# Patient Record
Sex: FEMALE | Race: WHITE | Employment: OTHER | URBAN - NONMETROPOLITAN AREA
[De-identification: names, ages, dates, MRNs, and addresses within clinical notes are randomized per-mention and may not be internally consistent; named-entity substitution may affect disease eponyms.]

---

## 2023-10-25 NOTE — PROGRESS NOTES
[facility]  Pulmonary medicine clinic initial consult note. Patient: Luigi Gilbert  : 1940      Chief complaint/[place]: Luigi Gilbert is a 79 y.o. old female came for further evaluation regarding her COPD and shortness of breath with referral from Virginie Bailey, APRN - CNP. She is having shortness of breath:Yes  Onset: Gradual  Duration:{NUMBERS 0-12:14681}{DURATION:}. Diurnal variation:  None. Worse {Time; morning/afternoon/evenin::\"in the morning\"}  Functional status prior to beginning of symptoms: Distance she can walk on level ground: {NUMBERS; 100-1000 :41398} feet. Current functional capacity on level ground: Distance she can walk on level ground: {NUMBERS; 100-1000 :78609} feet. She can climb steps: {YES/NO:::\"No\"}  Flights of steps she can climb: {NUMBERS; 0-10:5044}  She gives a history of orthopnea:{YES/NO:::\"Yes\"}  She gives a history of paroxysmal nocturnal dyspnea:{YES/NO:::\"Yes\"}       She is having cough: {YES/NO:::\"Yes\"}  Duration of cough: for {NUMBERS 1-30:118663570} days. Her cough is associated with sputum production: {YES/NO:::\"Yes\"}   The sputum color: {COLOR:2373459487::\"clear\"}  Hemoptysis:{YES/NO:::\"No\"}  Diurnal variation: None. Worse {Time; morning/afternoon/evenin::\"in the morning\"}  Relieving factors: {YES/NO:::\"Yes\"}  Aggravating factors: {YES/NO:::\"No\"}    She is having chest pain:{YES/NO:::\"No\"}  Duration:Days:{NUMBERS 0-12:83049}  Onset:{DESC; ONSET:8}. Location:predominantly on {LOCATION:7165752228}  Nature/Quality:{PAIN QUALITY:47498}  Assessment:{PAIN ASSESSMENT:71676}. Radiation:{Pain radiation-gi:87774}  Scale:{pain scale:787861792}/10  Relation ship to breathing: {Increased/decreased/unchanged:59184} with inspiration.     She is currently using any oxygen supplementation at rest, exercise or during sleep/at night time:

## 2023-11-17 ENCOUNTER — HOSPITAL ENCOUNTER (OUTPATIENT)
Dept: CT IMAGING | Age: 83
Discharge: HOME | End: 2023-11-17
Attending: RADIOLOGY

## 2023-11-17 ENCOUNTER — HOSPITAL ENCOUNTER (OUTPATIENT)
Dept: GENERAL RADIOLOGY | Age: 83
Discharge: HOME | End: 2023-11-17
Attending: RADIOLOGY

## 2023-11-17 DIAGNOSIS — Z00.6 EXAMINATION FOR NORMAL COMPARISON OR CONTROL IN CLINICAL RESEARCH: ICD-10-CM

## 2023-11-20 ENCOUNTER — TELEPHONE (OUTPATIENT)
Dept: PULMONOLOGY | Age: 83
End: 2023-11-20

## 2023-11-20 ENCOUNTER — OFFICE VISIT (OUTPATIENT)
Dept: PULMONOLOGY | Age: 83
End: 2023-11-20
Payer: MEDICARE

## 2023-11-20 VITALS
WEIGHT: 88.8 LBS | BODY MASS INDEX: 16.34 KG/M2 | TEMPERATURE: 97.8 F | OXYGEN SATURATION: 93 % | HEIGHT: 62 IN | SYSTOLIC BLOOD PRESSURE: 118 MMHG | DIASTOLIC BLOOD PRESSURE: 64 MMHG | HEART RATE: 108 BPM

## 2023-11-20 DIAGNOSIS — R91.1 LUNG NODULE: ICD-10-CM

## 2023-11-20 DIAGNOSIS — J96.11 CHRONIC HYPOXIC RESPIRATORY FAILURE (HCC): ICD-10-CM

## 2023-11-20 DIAGNOSIS — J44.9 CHRONIC OBSTRUCTIVE PULMONARY DISEASE, UNSPECIFIED COPD TYPE (HCC): Primary | ICD-10-CM

## 2023-11-20 DIAGNOSIS — J44.9 CHRONIC OBSTRUCTIVE PULMONARY DISEASE, UNSPECIFIED COPD TYPE (HCC): ICD-10-CM

## 2023-11-20 PROCEDURE — 1090F PRES/ABSN URINE INCON ASSESS: CPT | Performed by: INTERNAL MEDICINE

## 2023-11-20 PROCEDURE — G8428 CUR MEDS NOT DOCUMENT: HCPCS | Performed by: INTERNAL MEDICINE

## 2023-11-20 PROCEDURE — 1036F TOBACCO NON-USER: CPT | Performed by: INTERNAL MEDICINE

## 2023-11-20 PROCEDURE — G8400 PT W/DXA NO RESULTS DOC: HCPCS | Performed by: INTERNAL MEDICINE

## 2023-11-20 PROCEDURE — G8419 CALC BMI OUT NRM PARAM NOF/U: HCPCS | Performed by: INTERNAL MEDICINE

## 2023-11-20 PROCEDURE — 3023F SPIROM DOC REV: CPT | Performed by: INTERNAL MEDICINE

## 2023-11-20 PROCEDURE — 99204 OFFICE O/P NEW MOD 45 MIN: CPT | Performed by: INTERNAL MEDICINE

## 2023-11-20 PROCEDURE — 1123F ACP DISCUSS/DSCN MKR DOCD: CPT | Performed by: INTERNAL MEDICINE

## 2023-11-20 PROCEDURE — G8484 FLU IMMUNIZE NO ADMIN: HCPCS | Performed by: INTERNAL MEDICINE

## 2023-11-20 RX ORDER — ASPIRIN 81 MG/1
81 TABLET ORAL DAILY
COMMUNITY

## 2023-11-20 RX ORDER — VALACYCLOVIR HYDROCHLORIDE 1 G/1
1000 TABLET, FILM COATED ORAL 2 TIMES DAILY
COMMUNITY

## 2023-11-20 RX ORDER — FORMOTEROL FUMARATE 20 UG/2ML
20 SOLUTION RESPIRATORY (INHALATION)
COMMUNITY

## 2023-11-20 NOTE — PROGRESS NOTES
Neck Circumference -   11  Mallampati - 4    Lung Nodule Screening     [] Qualifies    [] Does not qualify   [] Declined    [] Completed

## 2023-11-20 NOTE — TELEPHONE ENCOUNTER
Hao Evans called and received order for patients over night pulse ox however they were wondering if this needed to be done on Room air or with her current oxygen? I do not see this on the order or your note. Thanks!

## 2023-11-20 NOTE — PROGRESS NOTES
[facility]  Pulmonary medicine clinic initial consult note. Patient: Sarah Scott  : 1940      Chief complaint/[place]: Sarah Scott is a 79 y.o. old female came for further evaluation regarding her COPD and shortness of breath with referral from Virginie Bailey, APRN - CNP. Patient presents for continued evaluation of SOB/COPD. She has a known history of COPD and chronic hypoxic respiratory failure. She is on 3L nc all the time. She reports no sputum production, though does endorse chest congestion. She is maintained on Symbicort at home, does not use albuterol rescue inhaler. She reports her SOB has improved recently, denies SOB at rest. Does endorse significant KRISHNA chronically along with chronic unchanged pleuritic chest discomfort that occurs intermittently. Following with cardiology currently. She is having shortness of breath:Yes  Onset: Gradual  Diurnal variation:  None. Functional status prior to beginning of symptoms: Distance she can walk on level ground: 200 feet. Current functional capacity on level ground: Distance she can walk on level ground: 200 feet. She can climb steps: No  Flights of steps she can climb: 1  She gives a history of orthopnea:No  She gives a history of paroxysmal nocturnal dyspnea:No       She is having cough: No  Her cough is associated with sputum production: No   The sputum color: clear  Hemoptysis:No  Diurnal variation: None. Relieving factors: Yes - Rest  Aggravating factors: Yes - Exertion    She is having chest pain:Yes - chronic  Duration: Chronic, pleuritic  Onset: intermittent. Location:predominantly on substernal  Nature/Quality:sharp  Assessment:intermittent. Radiation:nonradiating  Scale:4/10  Relation ship to breathing: increased with inspiration.     She is currently using any oxygen supplementation at rest, exercise or during sleep/at night time: Yes    She was ever diagnosed with following

## 2023-11-20 NOTE — PATIENT INSTRUCTIONS
Can continue current inhalers  Repeat lung function testing and CT chest ordered  Follow-up in 1 month

## 2023-12-19 DIAGNOSIS — R91.1 LUNG NODULE: ICD-10-CM

## 2023-12-19 DIAGNOSIS — J44.9 CHRONIC OBSTRUCTIVE PULMONARY DISEASE, UNSPECIFIED COPD TYPE (HCC): ICD-10-CM

## 2023-12-27 ENCOUNTER — HOSPITAL ENCOUNTER (OUTPATIENT)
Dept: CT IMAGING | Age: 83
Discharge: HOME | End: 2023-12-27
Attending: RADIOLOGY

## 2023-12-27 ENCOUNTER — TELEPHONE (OUTPATIENT)
Dept: PULMONOLOGY | Age: 83
End: 2023-12-27

## 2023-12-27 DIAGNOSIS — Z00.6 EXAMINATION FOR NORMAL COMPARISON OR CONTROL IN CLINICAL RESEARCH: ICD-10-CM

## 2023-12-27 DIAGNOSIS — J44.9 CHRONIC OBSTRUCTIVE PULMONARY DISEASE, UNSPECIFIED COPD TYPE (HCC): ICD-10-CM

## 2023-12-27 NOTE — TELEPHONE ENCOUNTER
Yoanna called from [facility] and Pt has not had her Noc Pulse ox due to not feeling able to be without O2 at night. She has an appt here tomorrow.

## 2023-12-28 ENCOUNTER — TELEPHONE (OUTPATIENT)
Dept: PULMONOLOGY | Age: 83
End: 2023-12-28

## 2023-12-28 ENCOUNTER — OFFICE VISIT (OUTPATIENT)
Dept: PULMONOLOGY | Age: 83
End: 2023-12-28
Payer: MEDICARE

## 2023-12-28 VITALS
TEMPERATURE: 99 F | DIASTOLIC BLOOD PRESSURE: 62 MMHG | HEART RATE: 94 BPM | BODY MASS INDEX: 16.34 KG/M2 | SYSTOLIC BLOOD PRESSURE: 110 MMHG | HEIGHT: 62 IN | WEIGHT: 88.8 LBS | OXYGEN SATURATION: 98 %

## 2023-12-28 DIAGNOSIS — R91.8 MULTIPLE LUNG NODULES ON CT: ICD-10-CM

## 2023-12-28 DIAGNOSIS — J44.9 STAGE 3 SEVERE COPD BY GOLD CLASSIFICATION (HCC): Primary | ICD-10-CM

## 2023-12-28 DIAGNOSIS — R93.89 ABNORMAL CT OF THE CHEST: ICD-10-CM

## 2023-12-28 PROCEDURE — G8419 CALC BMI OUT NRM PARAM NOF/U: HCPCS | Performed by: INTERNAL MEDICINE

## 2023-12-28 PROCEDURE — 1090F PRES/ABSN URINE INCON ASSESS: CPT | Performed by: INTERNAL MEDICINE

## 2023-12-28 PROCEDURE — 99215 OFFICE O/P EST HI 40 MIN: CPT | Performed by: INTERNAL MEDICINE

## 2023-12-28 PROCEDURE — G8400 PT W/DXA NO RESULTS DOC: HCPCS | Performed by: INTERNAL MEDICINE

## 2023-12-28 PROCEDURE — G8427 DOCREV CUR MEDS BY ELIG CLIN: HCPCS | Performed by: INTERNAL MEDICINE

## 2023-12-28 PROCEDURE — 3023F SPIROM DOC REV: CPT | Performed by: INTERNAL MEDICINE

## 2023-12-28 PROCEDURE — 1036F TOBACCO NON-USER: CPT | Performed by: INTERNAL MEDICINE

## 2023-12-28 PROCEDURE — G8484 FLU IMMUNIZE NO ADMIN: HCPCS | Performed by: INTERNAL MEDICINE

## 2023-12-28 PROCEDURE — 1123F ACP DISCUSS/DSCN MKR DOCD: CPT | Performed by: INTERNAL MEDICINE

## 2023-12-28 RX ORDER — BUDESONIDE 0.5 MG/2ML
2 INHALANT ORAL 2 TIMES DAILY
Qty: 120 ML | Refills: 11 | Status: SHIPPED | OUTPATIENT
Start: 2023-12-28 | End: 2024-12-27

## 2023-12-28 RX ORDER — FORMOTEROL FUMARATE DIHYDRATE 20 UG/2ML
20 SOLUTION RESPIRATORY (INHALATION)
Qty: 120 ML | Refills: 11 | Status: SHIPPED | OUTPATIENT
Start: 2023-12-28 | End: 2023-12-28 | Stop reason: SDUPTHER

## 2023-12-28 RX ORDER — FORMOTEROL FUMARATE DIHYDRATE 20 UG/2ML
20 SOLUTION RESPIRATORY (INHALATION)
Qty: 120 ML | Refills: 11 | Status: SHIPPED | OUTPATIENT
Start: 2023-12-28 | End: 2024-12-27

## 2023-12-28 RX ORDER — BUDESONIDE 0.5 MG/2ML
2 INHALANT ORAL 2 TIMES DAILY
Qty: 120 ML | Refills: 11 | Status: SHIPPED | OUTPATIENT
Start: 2023-12-28 | End: 2023-12-28 | Stop reason: SDUPTHER

## 2023-12-28 NOTE — PROGRESS NOTES
Neck Circumference -   11  Mallampati - 4    Lung Nodule Screening     [] Qualifies    [] Does not qualify   [] Declined    [] Completed
and nebulization with good compliance. Patient verbalizes understanding.  - Patient educated to update his pneumococcal vaccine with family physician and take influenza vaccine in coming season with out fail. Patient verbalizes understanding.  -Continue pulmonary rehab therapy for her COPD. -Send Fcey-2-Pyegfuuarkg swab today to obtain result before next clinic visit. -Edi Fleming was advised to make early appointment with my clinic if she develops any constitutional symptoms including loss of weight, poor appetite or hemoptysis. She verbalizes under standing.  - Schedule patient for follow up with my clinic in 6 months with recommended tests I.e CT chest without contrast. Patient advised to make early appointment if needed. - Patient educated to update his pneumococcal vaccine, Covid vaccine, Shingles vaccine, Respiratory Syncytial Virus vaccine with family physician and take influenza vaccine in coming season with out fail. Patient verbalizes understanding.  -Edi Fleming and her daughter were educated about my impression and plan. They verbalizes understanding.      -I personally reviewed and updated the Past medical hx, Past surgical hx,Social hx, Family hx, Medications, Allergies in the discrete data section of the patient chart. I also reviewed pertaining labs and Pulmonary medicine,Sleep medicine related, Pathological, Microbiological and Radiological investigations. Total time spent interviewing the patient and/or family, evaluating lab data and X-ray data and processing orders was 45 Minutes. I personally spent more than 50% of the appointment time face to face with the patient providing counseling and coordinating the patient's care.

## 2023-12-28 NOTE — TELEPHONE ENCOUNTER
Pt was here today and we need to send her scripts to [facility] thru [facility]. I Called [facility] and this is not the pharnacy they use. I called pt and she said it is [id number]. Loaded pharmacy in if you want to escript. Or if printed at office I will fax.